# Patient Record
Sex: FEMALE | Race: WHITE | NOT HISPANIC OR LATINO | Employment: FULL TIME | URBAN - METROPOLITAN AREA
[De-identification: names, ages, dates, MRNs, and addresses within clinical notes are randomized per-mention and may not be internally consistent; named-entity substitution may affect disease eponyms.]

---

## 2019-11-26 ENCOUNTER — OFFICE VISIT (OUTPATIENT)
Dept: GASTROENTEROLOGY | Facility: CLINIC | Age: 59
End: 2019-11-26

## 2019-11-26 VITALS
BODY MASS INDEX: 25.01 KG/M2 | HEIGHT: 60 IN | DIASTOLIC BLOOD PRESSURE: 66 MMHG | WEIGHT: 127.4 LBS | SYSTOLIC BLOOD PRESSURE: 116 MMHG

## 2019-11-26 DIAGNOSIS — Z83.71 FAMILY HISTORY OF COLONIC POLYPS: ICD-10-CM

## 2019-11-26 DIAGNOSIS — R10.11 RIGHT UPPER QUADRANT ABDOMINAL PAIN: Primary | ICD-10-CM

## 2019-11-26 PROCEDURE — 99203 OFFICE O/P NEW LOW 30 MIN: CPT | Performed by: INTERNAL MEDICINE

## 2019-11-26 RX ORDER — PANTOPRAZOLE SODIUM 40 MG/1
40 TABLET, DELAYED RELEASE ORAL DAILY
COMMUNITY
End: 2020-08-20

## 2019-11-26 RX ORDER — SUCRALFATE ORAL 1 G/10ML
1 SUSPENSION ORAL 2 TIMES DAILY
Qty: 600 ML | Refills: 1 | Status: SHIPPED | OUTPATIENT
Start: 2019-11-26 | End: 2020-08-20

## 2019-11-26 RX ORDER — DICYCLOMINE HCL 20 MG
20 TABLET ORAL EVERY 6 HOURS PRN
Refills: 2 | COMMUNITY
Start: 2019-11-11 | End: 2020-08-20

## 2019-11-26 NOTE — PROGRESS NOTES
PATIENT INFORMATION  Aminata Woodward       - 1960    CHIEF COMPLAINT  Chief Complaint   Patient presents with   • Abdominal Pain   • Diarrhea   • Heartburn       HISTORY OF PRESENT ILLNESS  HPI    60 yo with ruq pain since , consistently getting worse, worse after meals. She also notes upper back pain, not sure if related to ruq pain. Moderate severity,radiation to back.  Weight down 5 pounds. She has been following a BRAT diet. US no stones. HIDA report reviewed-ef of 51%. Started pp on 2019.  EGD and CLS in  for gi bleed. Found to have  and colon polyps.   Drinks one glass wine nightly, stopped since .  Dad with history of colon polyps  REVIEW OF SYSTEMS  Review of Systems   Constitutional: Positive for fatigue and fever.   HENT: Positive for sinus pressure and tinnitus.    Gastrointestinal: Positive for abdominal pain, diarrhea, nausea and vomiting.        Reflux   All other systems reviewed and are negative.        ACTIVE PROBLEMS  There are no active problems to display for this patient.        PAST MEDICAL HISTORY  Past Medical History:   Diagnosis Date   • Colon polyp    • Disease of thyroid gland    • Hypertension          SURGICAL HISTORY  Past Surgical History:   Procedure Laterality Date   • COLONOSCOPY     • STOMACH SURGERY           FAMILY HISTORY  Family History   Problem Relation Age of Onset   • Heart failure Father    • Lung disease Father    • Colon polyps Father    • Colon cancer Maternal Grandmother    • Colon polyps Maternal Grandmother          SOCIAL HISTORY  Social History     Occupational History   • Not on file   Tobacco Use   • Smoking status: Never Smoker   • Smokeless tobacco: Never Used   Substance and Sexual Activity   • Alcohol use: No   • Drug use: No   • Sexual activity: Defer       Debilities/Disabilities Identified: None    Emotional Behavior: Appropriate    CURRENT MEDICATIONS    Current Outpatient Medications:   •  B  "Complex-C (B-COMPLEX WITH VITAMIN C) tablet, TAKE ONE CAPSULE BY MOUTH DAILY, Disp: , Rfl: 3  •  benazepril (LOTENSIN) 10 MG tablet, TAKE ONE TABLET BY MOUTH DAILY, Disp: , Rfl: 3  •  dicyclomine (BENTYL) 20 MG tablet, Take 20 mg by mouth Every 6 (Six) Hours As Needed., Disp: , Rfl: 2  •  levothyroxine (SYNTHROID, LEVOTHROID) 75 MCG tablet, , Disp: , Rfl:   •  pantoprazole (PROTONIX) 40 MG EC tablet, Take 40 mg by mouth Daily., Disp: , Rfl:   •  sucralfate (CARAFATE) 1 GM/10ML suspension, Take 10 mL by mouth 2 (Two) Times a Day., Disp: 600 mL, Rfl: 1    ALLERGIES  Erythromycin and Zithromax [azithromycin]    VITALS  Vitals:    11/26/19 1303   BP: 116/66   Weight: 57.8 kg (127 lb 6.4 oz)   Height: 152.4 cm (60\")       LAST RESULTS   Admission on 11/28/2017, Discharged on 11/28/2017   Component Date Value Ref Range Status   • Rapid Influenza A Ag 11/28/2017 neg   Final   • Rapid Influenza B Ag 11/28/2017 neg   Final   • Internal Control 11/28/2017 Passed  Passed Final   • Lot Number 11/28/2017 [id number]   Final   • Expiration Date 11/28/2017 04/20/2019   Final     No results found.    PHYSICAL EXAM  Physical Exam   Constitutional: She is oriented to person, place, and time. She appears well-developed and well-nourished. No distress.   HENT:   Head: Normocephalic and atraumatic.   Mouth/Throat: Oropharynx is clear and moist.   Eyes: EOM are normal. Pupils are equal, round, and reactive to light.   Neck: Normal range of motion. No tracheal deviation present.   Cardiovascular: Normal rate, regular rhythm, normal heart sounds and intact distal pulses. Exam reveals no gallop and no friction rub.   No murmur heard.  Pulmonary/Chest: Effort normal and breath sounds normal. No stridor. No respiratory distress. She has no wheezes. She has no rales. She exhibits no tenderness.   Abdominal: Soft. Bowel sounds are normal. She exhibits no distension. There is no tenderness. There is no rebound and no guarding.   ruq abdominal pain, " no rebound or guarding   Musculoskeletal: She exhibits no edema.   Lymphadenopathy:     She has no cervical adenopathy.   Neurological: She is alert and oriented to person, place, and time.   Skin: Skin is warm. She is not diaphoretic.   Psychiatric: She has a normal mood and affect. Her behavior is normal. Judgment and thought content normal.   Nursing note and vitals reviewed.      ASSESSMENT  Diagnoses and all orders for this visit:    Right upper quadrant abdominal pain    Family history of colonic polyps    Other orders  -     dicyclomine (BENTYL) 20 MG tablet; Take 20 mg by mouth Every 6 (Six) Hours As Needed.  -     pantoprazole (PROTONIX) 40 MG EC tablet; Take 40 mg by mouth Daily.  -     sucralfate (CARAFATE) 1 GM/10ML suspension; Take 10 mL by mouth 2 (Two) Times a Day.          PLAN  No Follow-up on file.      Switch ppi to am on empty stomach. Add carafate    Antireflux measures and dietary modifications reviewed. Low acid diet reviewed. Keep head of bed elevated. Stop eating/drinking at least 3 hours prior to bedtime. Eliminate caffeine and carbonated beverages.  Weight loss encouraged if BMI over 25.    Indications, risks and procedure were discussed with the patient, including but not limited to, bleeding, infection, possibility of perforation and possible polypectomy. All of the patient's questions were answered, and signed informed consent was obtained and placed on the chart.

## 2019-12-02 RX ORDER — SODIUM, POTASSIUM,MAG SULFATES 17.5-3.13G
1 SOLUTION, RECONSTITUTED, ORAL ORAL EVERY 12 HOURS
Qty: 2 BOTTLE | Refills: 0 | Status: ON HOLD | OUTPATIENT
Start: 2019-12-02 | End: 2019-12-06

## 2019-12-05 ENCOUNTER — DOCUMENTATION (OUTPATIENT)
Dept: SURGERY | Facility: CLINIC | Age: 59
End: 2019-12-05

## 2019-12-06 ENCOUNTER — HOSPITAL ENCOUNTER (OUTPATIENT)
Facility: HOSPITAL | Age: 59
Setting detail: HOSPITAL OUTPATIENT SURGERY
Discharge: HOME | End: 2019-12-06
Attending: INTERNAL MEDICINE | Admitting: INTERNAL MEDICINE

## 2019-12-06 ENCOUNTER — ANESTHESIA EVENT (OUTPATIENT)
Dept: PERIOP | Facility: HOSPITAL | Age: 59
End: 2019-12-06

## 2019-12-06 ENCOUNTER — ANESTHESIA (OUTPATIENT)
Dept: PERIOP | Facility: HOSPITAL | Age: 59
End: 2019-12-06

## 2019-12-06 VITALS
SYSTOLIC BLOOD PRESSURE: 150 MMHG | WEIGHT: 123 LBS | HEART RATE: 78 BPM | OXYGEN SATURATION: 99 % | TEMPERATURE: 98.4 F | RESPIRATION RATE: 16 BRPM | DIASTOLIC BLOOD PRESSURE: 81 MMHG | BODY MASS INDEX: 24.02 KG/M2

## 2019-12-06 DIAGNOSIS — Z83.71 FAMILY HISTORY OF COLONIC POLYPS: ICD-10-CM

## 2019-12-06 DIAGNOSIS — R10.11 RIGHT UPPER QUADRANT ABDOMINAL PAIN: ICD-10-CM

## 2019-12-06 PROCEDURE — 45380 COLONOSCOPY AND BIOPSY: CPT | Performed by: INTERNAL MEDICINE

## 2019-12-06 PROCEDURE — 43239 EGD BIOPSY SINGLE/MULTIPLE: CPT | Performed by: INTERNAL MEDICINE

## 2019-12-06 PROCEDURE — 25010000002 PROPOFOL 10 MG/ML EMULSION: Performed by: NURSE ANESTHETIST, CERTIFIED REGISTERED

## 2019-12-06 PROCEDURE — 88305 TISSUE EXAM BY PATHOLOGIST: CPT | Performed by: INTERNAL MEDICINE

## 2019-12-06 RX ORDER — MEPERIDINE HYDROCHLORIDE 25 MG/ML
12.5 INJECTION INTRAMUSCULAR; INTRAVENOUS; SUBCUTANEOUS
Status: DISCONTINUED | OUTPATIENT
Start: 2019-12-06 | End: 2019-12-06 | Stop reason: HOSPADM

## 2019-12-06 RX ORDER — SODIUM CHLORIDE 9 MG/ML
40 INJECTION, SOLUTION INTRAVENOUS AS NEEDED
Status: DISCONTINUED | OUTPATIENT
Start: 2019-12-06 | End: 2019-12-06 | Stop reason: HOSPADM

## 2019-12-06 RX ORDER — SODIUM CHLORIDE, SODIUM LACTATE, POTASSIUM CHLORIDE, CALCIUM CHLORIDE 600; 310; 30; 20 MG/100ML; MG/100ML; MG/100ML; MG/100ML
9 INJECTION, SOLUTION INTRAVENOUS CONTINUOUS
Status: DISCONTINUED | OUTPATIENT
Start: 2019-12-06 | End: 2019-12-06 | Stop reason: HOSPADM

## 2019-12-06 RX ORDER — LIDOCAINE HYDROCHLORIDE 20 MG/ML
INJECTION, SOLUTION INFILTRATION; PERINEURAL AS NEEDED
Status: DISCONTINUED | OUTPATIENT
Start: 2019-12-06 | End: 2019-12-06 | Stop reason: SURG

## 2019-12-06 RX ORDER — SODIUM CHLORIDE 0.9 % (FLUSH) 0.9 %
1-10 SYRINGE (ML) INJECTION AS NEEDED
Status: DISCONTINUED | OUTPATIENT
Start: 2019-12-06 | End: 2019-12-06 | Stop reason: HOSPADM

## 2019-12-06 RX ORDER — ONDANSETRON 2 MG/ML
4 INJECTION INTRAMUSCULAR; INTRAVENOUS ONCE AS NEEDED
Status: DISCONTINUED | OUTPATIENT
Start: 2019-12-06 | End: 2019-12-06 | Stop reason: HOSPADM

## 2019-12-06 RX ORDER — SODIUM CHLORIDE 0.9 % (FLUSH) 0.9 %
3 SYRINGE (ML) INJECTION EVERY 12 HOURS SCHEDULED
Status: DISCONTINUED | OUTPATIENT
Start: 2019-12-06 | End: 2019-12-06 | Stop reason: HOSPADM

## 2019-12-06 RX ORDER — MAGNESIUM HYDROXIDE 1200 MG/15ML
LIQUID ORAL AS NEEDED
Status: DISCONTINUED | OUTPATIENT
Start: 2019-12-06 | End: 2019-12-06 | Stop reason: HOSPADM

## 2019-12-06 RX ORDER — ESCITALOPRAM OXALATE 10 MG/1
10 TABLET ORAL DAILY
COMMUNITY
End: 2020-08-20

## 2019-12-06 RX ORDER — SODIUM CHLORIDE, SODIUM LACTATE, POTASSIUM CHLORIDE, CALCIUM CHLORIDE 600; 310; 30; 20 MG/100ML; MG/100ML; MG/100ML; MG/100ML
100 INJECTION, SOLUTION INTRAVENOUS CONTINUOUS
Status: DISCONTINUED | OUTPATIENT
Start: 2019-12-06 | End: 2019-12-06 | Stop reason: HOSPADM

## 2019-12-06 RX ORDER — PROPOFOL 10 MG/ML
VIAL (ML) INTRAVENOUS AS NEEDED
Status: DISCONTINUED | OUTPATIENT
Start: 2019-12-06 | End: 2019-12-06 | Stop reason: SURG

## 2019-12-06 RX ORDER — LIDOCAINE HYDROCHLORIDE 10 MG/ML
0.5 INJECTION, SOLUTION EPIDURAL; INFILTRATION; INTRACAUDAL; PERINEURAL ONCE AS NEEDED
Status: COMPLETED | OUTPATIENT
Start: 2019-12-06 | End: 2019-12-06

## 2019-12-06 RX ADMIN — PROPOFOL 50 MG: 10 INJECTION, EMULSION INTRAVENOUS at 15:19

## 2019-12-06 RX ADMIN — PROPOFOL 50 MG: 10 INJECTION, EMULSION INTRAVENOUS at 15:28

## 2019-12-06 RX ADMIN — LIDOCAINE HYDROCHLORIDE 0.5 ML: 10 INJECTION, SOLUTION EPIDURAL; INFILTRATION; INTRACAUDAL; PERINEURAL at 13:00

## 2019-12-06 RX ADMIN — PROPOFOL 50 MG: 10 INJECTION, EMULSION INTRAVENOUS at 15:23

## 2019-12-06 RX ADMIN — PROPOFOL 50 MG: 10 INJECTION, EMULSION INTRAVENOUS at 15:08

## 2019-12-06 RX ADMIN — SODIUM CHLORIDE, POTASSIUM CHLORIDE, SODIUM LACTATE AND CALCIUM CHLORIDE 9 ML/HR: 600; 310; 30; 20 INJECTION, SOLUTION INTRAVENOUS at 13:00

## 2019-12-06 RX ADMIN — LIDOCAINE HYDROCHLORIDE 100 MG: 20 INJECTION, SOLUTION INFILTRATION; PERINEURAL at 15:01

## 2019-12-06 RX ADMIN — PROPOFOL 30 MG: 10 INJECTION, EMULSION INTRAVENOUS at 15:31

## 2019-12-06 RX ADMIN — SODIUM CHLORIDE, POTASSIUM CHLORIDE, SODIUM LACTATE AND CALCIUM CHLORIDE: 600; 310; 30; 20 INJECTION, SOLUTION INTRAVENOUS at 14:30

## 2019-12-06 RX ADMIN — PROPOFOL 50 MG: 10 INJECTION, EMULSION INTRAVENOUS at 15:02

## 2019-12-06 RX ADMIN — PROPOFOL 50 MG: 10 INJECTION, EMULSION INTRAVENOUS at 15:04

## 2019-12-06 RX ADMIN — PROPOFOL 50 MG: 10 INJECTION, EMULSION INTRAVENOUS at 15:15

## 2019-12-06 NOTE — OP NOTE
Patient Name:  Aminata Woodward  YOB: 1960    Date of Procedure:  12/6/2019    Procedure Performed: EGD and colonoscopy     Indications: Right upper quadrant pain, family history of polyps    Pre-op Diagnosis:   Right upper quadrant abdominal pain [R10.11]  Family history of colonic polyps [Z83.71]    Post-Op Diagnosis Codes:     * Right upper quadrant abdominal pain [R10.11]     * Family history of colonic polyps [Z83.71]         Staff:  Surgeon(s):  Marlene Chauhan MD         Consent: Procedure EGD and colonoscopy indications, risks and procedure were discussed with the patient, including but not limited to, bleeding, infection, possibility of perforation and possible polypectomy. All of the patient's questions were answered, and signed informed consent was obtained and placed on the chart.      Sedation: Sedation was provided by anesthesia.      Estimated Blood Loss: minimal    Specimens:   ID Type Source Tests Collected by Time   A : small bowel biopsy Tissue Small Intestine, Duodenum TISSUE PATHOLOGY EXAM Marlene Chauhan MD 12/6/2019 1505   B : gastric biopsy Tissue Stomach TISSUE PATHOLOGY EXAM Marlene Chauhan MD 12/6/2019 1505   C : gastric polyp Polyp Stomach TISSUE PATHOLOGY EXAM Marlene Chauhan MD 12/6/2019 1508   D : ascending polyp Polyp Large Intestine, Right / Ascending Colon TISSUE PATHOLOGY EXAM Marlene Chauhan MD 12/6/2019 1525         Description of Procedure:   After excellent sedation a flexible endoscope was passed into the oropharynx into the distal esophagus.  A sliding hiatal hernia is noted with a nonoccluding Schatzki's ring.  The scope was easily traversed into the stomach all the way into the antrum.  Gastritis is noted here biopsies are obtained using forceps.  The scope was retroflexed here straightened and passed into the duodenal bulb all the way to the second portion with ease.  Small bowel biopsies are obtained using forceps.  Scope was then slowly withdrawn  back out to the stomach gastric polyps are noted removed using forceps.  The scope is slowly withdrawn out the patient with no immediate complications.  She was then turned around to the appropriate position and a digital rectal examination was performed and a flexible colonoscope was inserted into the rectum passed to the cecum.  The cecum was identified by both the ileocecal valve and the appendiceal orifice.  She had some sharp angulation in the sigmoid colon such that a regular colonoscope was withdrawn out of the patient and a pediatric one was used to successfully traverse the scope into the cecal bowl.  The overall bowel preparation was poor.  She had copious amounts of semisolid solid stool noted throughout the colon.  Much time was taken to wash and suction out these areas as much as possible.  Upon withdrawal scope careful examination mucosa was made.  A single ascending colon polyp was seen and removed using forceps.  Scope was slowly withdrawn out the colon with careful lavaging and suctioning as much as possible.  The scope was withdrawn to the rectum retroflexed were internal hemorrhoids are noted.  Scope was then slowly withdrawn out the patient with no immediate complications and she tolerated procedure well.     Withdrawal time: 14 minutes  Quality of bowel preparation: Poor    Findings:   Single polyp removed using forceps  Poor colon prep  Internal hemorrhoids  Nonoccluding Schatzki's ring  Hiatal hernia  Gastritis  We will await biopsy results.  She will see me back in the office in follow-up     Complications: None        Marlene Chauhan MD     Date: 12/6/2019  Time: 3:43 PM

## 2019-12-06 NOTE — H&P
PATIENT INFORMATION  Aminata Woodward         - 1960     CHIEF COMPLAINT      Chief Complaint   Patient presents with   • Abdominal Pain   • Diarrhea   • Heartburn         HISTORY OF PRESENT ILLNESS  HPI     58 yo with ruq pain since , consistently getting worse, worse after meals. She also notes upper back pain, not sure if related to ruq pain. Moderate severity,radiation to back.  Weight down 5 pounds. She has been following a BRAT diet. US no stones. HIDA report reviewed-ef of 51%. Started pp on 2019.  EGD and CLS in  for gi bleed. Found to have  and colon polyps.   Drinks one glass wine nightly, stopped since .  Dad with history of colon polyps  REVIEW OF SYSTEMS  Review of Systems   Constitutional: Positive for fatigue and fever.   HENT: Positive for sinus pressure and tinnitus.    Gastrointestinal: Positive for abdominal pain, diarrhea, nausea and vomiting.        Reflux   All other systems reviewed and are negative.           ACTIVE PROBLEMS  There are no active problems to display for this patient.           PAST MEDICAL HISTORY  Medical History        Past Medical History:   Diagnosis Date   • Colon polyp     • Disease of thyroid gland     • Hypertension                 SURGICAL HISTORY  Surgical History         Past Surgical History:   Procedure Laterality Date   • COLONOSCOPY       • STOMACH SURGERY                   FAMILY HISTORY        Family History   Problem Relation Age of Onset   • Heart failure Father     • Lung disease Father     • Colon polyps Father     • Colon cancer Maternal Grandmother     • Colon polyps Maternal Grandmother              SOCIAL HISTORY  Social History           Occupational History   • Not on file   Tobacco Use   • Smoking status: Never Smoker   • Smokeless tobacco: Never Used   Substance and Sexual Activity   • Alcohol use: No   • Drug use: No   • Sexual activity: Defer         Debilities/Disabilities Identified:  None     Emotional Behavior: Appropriate     CURRENT MEDICATIONS     Current Outpatient Medications:   •  B Complex-C (B-COMPLEX WITH VITAMIN C) tablet, TAKE ONE CAPSULE BY MOUTH DAILY, Disp: , Rfl: 3  •  benazepril (LOTENSIN) 10 MG tablet, TAKE ONE TABLET BY MOUTH DAILY, Disp: , Rfl: 3  •  dicyclomine (BENTYL) 20 MG tablet, Take 20 mg by mouth Every 6 (Six) Hours As Needed., Disp: , Rfl: 2  •  levothyroxine (SYNTHROID, LEVOTHROID) 75 MCG tablet, , Disp: , Rfl:   •  pantoprazole (PROTONIX) 40 MG EC tablet, Take 40 mg by mouth Daily., Disp: , Rfl:   •  sucralfate (CARAFATE) 1 GM/10ML suspension, Take 10 mL by mouth 2 (Two) Times a Day., Disp: 600 mL, Rfl: 1     ALLERGIES  Erythromycin and Zithromax [azithromycin]     VITALS  /74 (BP Location: Left arm, Patient Position: Lying)   Pulse 83   Temp 98.4 °F (36.9 °C) (Oral)   Resp 16   Wt 55.8 kg (123 lb)   SpO2 99%   BMI 24.02 kg/m²               LAST RESULTS                   Admission on 11/28/2017, Discharged on 11/28/2017   Component Date Value Ref Range Status   • Rapid Influenza A Ag 11/28/2017 neg    Final   • Rapid Influenza B Ag 11/28/2017 neg    Final   • Internal Control 11/28/2017 Passed  Passed Final   • Lot Number 11/28/2017 [id number]    Final   • Expiration Date 11/28/2017 04/20/2019    Final      No results found.     PHYSICAL EXAM  Physical Exam   Constitutional: She is oriented to person, place, and time. She appears well-developed and well-nourished. No distress.   HENT:   Head: Normocephalic and atraumatic.   Mouth/Throat: Oropharynx is clear and moist.   Eyes: EOM are normal. Pupils are equal, round, and reactive to light.   Neck: Normal range of motion. No tracheal deviation present.   Cardiovascular: Normal rate, regular rhythm, normal heart sounds and intact distal pulses. Exam reveals no gallop and no friction rub.   No murmur heard.  Pulmonary/Chest: Effort normal and breath sounds normal. No stridor. No respiratory distress. She has no  wheezes. She has no rales. She exhibits no tenderness.   Abdominal: Soft. Bowel sounds are normal. She exhibits no distension. There is no tenderness. There is no rebound and no guarding.   ruq abdominal pain, no rebound or guarding   Musculoskeletal: She exhibits no edema.   Lymphadenopathy:     She has no cervical adenopathy.   Neurological: She is alert and oriented to person, place, and time.   Skin: Skin is warm. She is not diaphoretic.   Psychiatric: She has a normal mood and affect. Her behavior is normal. Judgment and thought content normal.   Nursing note and vitals reviewed.        ASSESSMENT  Diagnoses and all orders for this visit:     Right upper quadrant abdominal pain     Family history of colonic polyps     Other orders  -     dicyclomine (BENTYL) 20 MG tablet; Take 20 mg by mouth Every 6 (Six) Hours As Needed.  -     pantoprazole (PROTONIX) 40 MG EC tablet; Take 40 mg by mouth Daily.  -     sucralfate (CARAFATE) 1 GM/10ML suspension; Take 10 mL by mouth 2 (Two) Times a Day.              PLAN  No Follow-up on file.        Switch ppi to am on empty stomach. Add carafate     Antireflux measures and dietary modifications reviewed. Low acid diet reviewed. Keep head of bed elevated. Stop eating/drinking at least 3 hours prior to bedtime. Eliminate caffeine and carbonated beverages.  Weight loss encouraged if BMI over 25.     Indications, risks and procedure were discussed with the patient, including but not limited to, bleeding, infection, possibility of perforation and possible polypectomy. All of the patient's questions were answered, and signed informed consent was obtained and placed on the chart.

## 2019-12-06 NOTE — ANESTHESIA PREPROCEDURE EVALUATION
Anesthesia Evaluation     history of anesthetic complications: PONV  NPO Solid Status: > 8 hours  NPO Liquid Status: > 8 hours           Airway   Mallampati: II  TM distance: >3 FB  Neck ROM: full  No difficulty expected  Dental - normal exam     Pulmonary - negative pulmonary ROS    breath sounds clear to auscultation  Cardiovascular   Exercise tolerance: good (4-7 METS)    Rhythm: regular  Rate: normal    (+) hypertension well controlled less than 2 medications,       Neuro/Psych  (+) psychiatric history Anxiety and Depression,     GI/Hepatic/Renal/Endo    (+)   thyroid problem hypothyroidism    Musculoskeletal (-) negative ROS    Abdominal    Substance History - negative use     OB/GYN          Other - negative ROS                       Anesthesia Plan    ASA 2     MAC     intravenous induction     Anesthetic plan, all risks, benefits, and alternatives have been provided, discussed and informed consent has been obtained with: patient.  Use of blood products discussed with patient  Consented to blood products.   Plan discussed with CRNA.

## 2019-12-06 NOTE — ANESTHESIA POSTPROCEDURE EVALUATION
Patient: Aminata Woodward    Procedure Summary     Date:  12/06/19 Room / Location:  [facility] ENDOSCOPY 2 /  [facility]    Anesthesia Start:  1457 Anesthesia Stop:  1540    Procedures:       ESOPHAGOGASTRODUODENOSCOPY with biopsies & polypectomy (N/A Esophagus)      COLONOSCOPY with polypectomy (N/A ) Diagnosis:       Right upper quadrant abdominal pain      Family history of colonic polyps      (Right upper quadrant abdominal pain [R10.11])      (Family history of colonic polyps [Z83.71])    Surgeon:  Marlene Chauhan MD Provider:  Sergio Hurd CRNA    Anesthesia Type:  MAC ASA Status:  2          Anesthesia Type: MAC  Last vitals  BP   118/74 (12/06/19 1257)   Temp   98.4 °F (36.9 °C) (12/06/19 1257)   Pulse   83 (12/06/19 1257)   Resp   16 (12/06/19 1257)     SpO2   99 % (12/06/19 1257)     Post Anesthesia Care and Evaluation    Patient location during evaluation: PHASE II  Patient participation: complete - patient participated  Level of consciousness: awake and alert  Pain score: 0  Pain management: satisfactory to patient  Airway patency: patent  Anesthetic complications: No anesthetic complications  PONV Status: none  Cardiovascular status: acceptable  Respiratory status: acceptable  Hydration status: acceptable

## 2019-12-09 LAB
CYTO UR: NORMAL
LAB AP CASE REPORT: NORMAL
PATH REPORT.FINAL DX SPEC: NORMAL
PATH REPORT.GROSS SPEC: NORMAL

## 2019-12-13 NOTE — PROGRESS NOTES
Small bowel biopsies are normal.  Gastric biopsies with mild gastritis no H. pylori.  Polyp removed from the stomach is benign fundic gland polyp.  Polyp in the colon is benign tissue.  Recall 5 years due to family history of polyps

## 2021-03-22 ENCOUNTER — BULK ORDERING (OUTPATIENT)
Dept: CASE MANAGEMENT | Facility: OTHER | Age: 61
End: 2021-03-22

## 2021-03-22 DIAGNOSIS — Z23 IMMUNIZATION DUE: ICD-10-CM

## (undated) DEVICE — GLV SURG SENSICARE MICRO PF LF 6 STRL

## (undated) DEVICE — MASK,FACE,FLUID RES,SHLD,FOGFREE,TIES: Brand: MEDLINE

## (undated) DEVICE — MASK,FACE,FLUID RESIST,SHLD,EARLOOP: Brand: MEDLINE

## (undated) DEVICE — Device: Brand: DEFENDO AIR/WATER/SUCTION AND BIOPSY VALVE

## (undated) DEVICE — THE BITE BLOCK MAXI, LATEX FREE STRAP IS USED TO PROTECT THE ENDOSCOPE INSERTION TUBE FROM BEING BITTEN BY THE PATIENT.

## (undated) DEVICE — SPNG GZ WOVN 4X4IN 12PLY 10/BX STRL

## (undated) DEVICE — VIAL FORMALIN CAP 10P 40ML

## (undated) DEVICE — BW-412T DISP COMBO CLEANING BRUSH: Brand: SINGLE USE COMBINATION CLEANING BRUSH

## (undated) DEVICE — Device

## (undated) DEVICE — GOWN ISOL W/THUMB UNIV BLU BX/15

## (undated) DEVICE — SYR LL 3CC

## (undated) DEVICE — SUCTION CANISTER, 3000CC,SAFELINER: Brand: DEROYAL

## (undated) DEVICE — JACKT LAB KNIT COLR LG BLU

## (undated) DEVICE — FRCP BX RADJAW4 NDL 2.8 240CM LG OG BX40